# Patient Record
(demographics unavailable — no encounter records)

---

## 2024-10-18 NOTE — PHYSICAL EXAM
[General Appearance - Alert] : alert [General Appearance - In No Acute Distress] : in no acute distress [Sclera] : the sclera and conjunctiva were normal [Hearing Threshold Finger Rub Not [place]] : hearing was normal [Jugular Venous Distention Increased] : there was no jugular-venous distention [Auscultation Breath Sounds / Voice Sounds] : lungs were clear to auscultation bilaterally [Heart Sounds] : normal S1 and S2 [Heart Sounds Gallop] : no gallops [Murmurs] : no murmurs [Edema] : there was no peripheral edema [Abdomen Tenderness] : non-tender [No Spinal Tenderness] : no spinal tenderness [Abnormal Walk] : normal gait [] : no rash [No Focal Deficits] : no focal deficits

## 2024-10-18 NOTE — ASSESSMENT
[FreeTextEntry1] : 1. Hypertension: BP in target. No need to continue Indapamide as the patient is on Valsartan/HCTZ. Labs today to check serum K.  2. Hypokalemia. On Eplerenone. 3. ELISA. Evaluation for INSPIRE device. Return in 2 months.

## 2024-10-18 NOTE — HISTORY OF PRESENT ILLNESS
[FreeTextEntry1] : Palpitations have resolved. Had a sinus infection. Dr. Hoang  is his ENT. Received antibiotics (does not recall the name). Being evaluated for Inspire device for ELISA as he does not tolerate the CPAP mask.   Feels better.  Here for BP check.  Reviewing the list of medications, it shows that the patient is also on Indapamide, 1.25 mg per day.

## 2024-10-18 NOTE — REASON FOR VISIT
[Follow-Up] : a follow-up visit [FreeTextEntry1] : Essential hypertension, hypokalemia, ELISA, palpitations.

## 2024-12-10 NOTE — PHYSICAL EXAM
[General Appearance - Alert] : alert [General Appearance - In No Acute Distress] : in no acute distress [Sclera] : the sclera and conjunctiva were normal [Hearing Threshold Finger Rub Not [place]] : hearing was normal [Jugular Venous Distention Increased] : there was no jugular-venous distention [Auscultation Breath Sounds / Voice Sounds] : lungs were clear to auscultation bilaterally [Heart Sounds] : normal S1 and S2 [Heart Sounds Gallop] : no gallops [Murmurs] : no murmurs [Edema] : there was no peripheral edema [Abdomen Tenderness] : non-tender [No Spinal Tenderness] : no spinal tenderness [Abnormal Walk] : normal gait [] : no rash [No Focal Deficits] : no focal deficits

## 2024-12-10 NOTE — HISTORY OF PRESENT ILLNESS
[FreeTextEntry1] : Palpitations have resolved.  Discontinued Crestor because of myalgias and arthralgias.  States he feels foggy about one hour after taking his medications in the morning.  Uses the CPAP but often wakes up in the morning without it. Considering Inspire device. Otherwise doing well.

## 2024-12-10 NOTE — ASSESSMENT
[FreeTextEntry1] : 1. Hypertension: BP in target. No need to continue Indapamide as the patient is on Valsartan/HCTZ. 2. Hypokalemia. On Eplerenone. Controlled 3. ELISA. Evaluation for INSPIRE device. 4. Fatigue: no changes in meds. Symptoms may be related to ELISA. 5. Hyperlipidemia: statin intolerance. Refer to Dr. Uriel Duvall for CV risk evaluation and treatment.  Return in 2 months.

## 2025-02-28 NOTE — ASSESSMENT
[FreeTextEntry1] : 1. Worsening of chronic sinusitis symptoms. Possible side effects of Repatha. Will discontinue it for 2 months. If improvement may try Praluent 75 mg every two weeks. 2. Essential hypertension: not controlled. Unclear what medication the patient is taking. The patient will review his home meds and email the list to me. Will make the appropriate changes once we know his regimen.  3 ELISA. Considering Inspire. Has follow up polysomonography but will do the test once his sinus symptoms have improved. 4. Hyperlipidemia intolerant to several statins, now also to Repatha. May need to switch to Praluent. Will review home medications and advise the patient.  Follow up 3 months.

## 2025-02-28 NOTE — PHYSICAL EXAM
[General Appearance - Alert] : alert [General Appearance - In No Acute Distress] : in no acute distress [Sclera] : the sclera and conjunctiva were normal [Hearing Threshold Finger Rub Not [place]] : hearing was normal [Jugular Venous Distention Increased] : there was no jugular-venous distention [Auscultation Breath Sounds / Voice Sounds] : lungs were clear to auscultation bilaterally [Heart Sounds] : normal S1 and S2 [Heart Sounds Gallop] : no gallops [Murmurs] : no murmurs [Edema] : there was no peripheral edema [No Spinal Tenderness] : no spinal tenderness [Abnormal Walk] : normal gait [] : no rash [No Focal Deficits] : no focal deficits [Oriented To Time, Place, And Person] : oriented to person, place, and time

## 2025-02-28 NOTE — END OF VISIT
[Time Spent: ___ minutes] : I have spent [unfilled] minutes of time on the encounter which excludes teaching and separately reported services.
(4) excellent

## 2025-02-28 NOTE — HISTORY OF PRESENT ILLNESS
[FreeTextEntry1] : Mr. Alegria has been experiencing several problems. Since starting Repatha he has developed upper respiratory symptoms w/ running nose and severe spells of cough interfering w/ his speech and causing chest discomfort. The symptoms are worse the days after he takes the Repatha and improve just before taking the next dose.  Also, he experience periods of fatigue and loss of concentration and at times his pulse rate in the 40s.  He continue to struggle w/ ELISA and although he tries to wear the CPAP mask every night, at times he wakes up at 3 AM and cannot fall back to sleep.  He monitors his BP at home. Review of the data shows that about 50% of the time his BP is over 140/90.  I have attempted to reconcile his medications: his list of meds include Torsemide 10 mg and does not have Valsartan/HCTZ. He is on Procardia 60 mg and on Carvedilol 6.25 mg bid. On Eplerenone 25 mg bid. Here for follow up.

## 2025-06-13 NOTE — ASSESSMENT
[FreeTextEntry1] : 1. Uncontrolled hypertension: side effects from Procardia and hair loss on Metoprolol. Long discussion about need to control modifiable risk factor for CVD.  Will add Amlodipine 2.5 mg and lower the Metoprolol dose to 25 mg per day. Consider combination Olmesartan, Amlodipine, Chlorthaldone.  Consideration for Minoxidil and renal denervation. Will do labs to screen for hyperaldosteronism (unlikely but possible).  2. Hyperlipidemia: discuss that this is also a CVD risk factor. Intolerant to statin and worsening sinus issues w/ Repatha 140 mg. In the future w/ try lower dose Praluent 75 mg SQ every two weeks. 3. ELISA: tolerating CPAP. May consider Zepbound for weight loss and improvement of ELISA and blood pressure (weight loss).  Follow up in August.

## 2025-06-13 NOTE — PHYSICAL EXAM
[General Appearance - Alert] : alert [General Appearance - In No Acute Distress] : in no acute distress [Sclera] : the sclera and conjunctiva were normal [Hearing Threshold Finger Rub Not [place]] : hearing was normal [Jugular Venous Distention Increased] : there was no jugular-venous distention [Auscultation Breath Sounds / Voice Sounds] : lungs were clear to auscultation bilaterally [Heart Sounds] : normal S1 and S2 [Heart Sounds Gallop] : no gallops [Murmurs] : no murmurs [Edema] : there was no peripheral edema [No Spinal Tenderness] : no spinal tenderness [Abnormal Walk] : normal gait [] : no rash [No Focal Deficits] : no focal deficits [Oriented To Time, Place, And Person] : oriented to person, place, and time

## 2025-06-13 NOTE — HISTORY OF PRESENT ILLNESS
[FreeTextEntry1] : Currently on Metoprolol 50 mg (but noted thinning of his hair), Valsartan 160/HCTZ 25, Eplerenone 25 mg bid. No Procardia as he was experiencing lightheadedness and weakness in his legs.  Using the CPAP regularly. Home BP in the 150/90 sometimes higher. Check his BP at different times. Concerned about the lack of BP control despite medications. Has seen his urologist who advised for him to have renal arterial Doppler. To be scheduled.  Also his PSA needs to be repeated (s/p Urolift). Here for evaluation of uncontrolled presumably essential hypertension.

## 2025-06-23 NOTE — HISTORY OF PRESENT ILLNESS
[FreeTextEntry1] : Dear Oziel,   I had the pleasure of seeing your patient ANASTASIA PAREDES for Cardiometabolic evaluation.   As you know, he  is a Pleasant, 60 year old with a past medical history of Obesity/ELISA, Hyperlipidemia, Statin Intolerance to Atorva/Rosuva for > 8 wks tried twice, Minimal ASCVD By CAC 2022 AU 33 AU,  HTN =============== LDLc 189 mg/dL  TTE -- 4-2024 - LVEF  Nl (OUTSIDE) =============== Obesity/ELISA/Hyperlipidemia, Statin Intolerance to Atorva/Rosuva for > 8 wks tried twice - TTE - Consider GLP1RA in Future - Start Repatha   Known PVCs 2% - Uninducible by EPS per Pt (By Dr. Rosado)   HTN - Carvedilol 12.5 BID - Nifedipine 30 XL - Valsartan-HCTZ 160/12.5 - Eplerenone 25    ---------------------------------------------------------------------------- ---------------------------------------------------------------------------- 6-2025 CC: Heart Issues - Patient reports no chest pain, no localization to the sternum, no radiation to the neck/jaw, no alleviating nor worsening precipitants to CP, no assoc symptoms to CP - Patient notes no associated SOB/Palps/Leg swelling - Reports No associated F/C/N/V/Headaches - Reports Normal Exercise Tolerance - Reports no medication changes - Reports normal mood/quality of life - Reports no associated midnight awakenings from cP - Reports no diet changes - Reports no associated body aches- Reports no recent colds/viruses- Recent labs/imaging reviewed- Relevant Family history reviewed Mother/Father - CVRisk Assessment for 10 Year ACC/AHA Pooled Risk Cohort Equation places this person at < 7.5% Risk of ASCVD - Carvedilol 12.5 BID - Nifedipine 30 XL - Valsartan-HCTZ 160/12.5- Eplerenone 25   - Adverse effect to Repatha - > Trying to Switch to Praluent 75, which is reasonable

## 2025-06-23 NOTE — PHYSICAL EXAM

## 2025-06-23 NOTE — DISCUSSION/SUMMARY
[EKG obtained to assist in diagnosis and management of assessed problem(s)] : EKG obtained to assist in diagnosis and management of assessed problem(s) [FreeTextEntry1] : In summary   Evelin, 60 year old with a past medical history of Obesity/ELISA, Hyperlipidemia, Statin Intolerance to Atorva/Rosuva for > 8 wks tried twice, Minimal ASCVD By CAC 2022 AU 33 AU,  HTN =============== LDLc 189 mg/dL  TTE -- 4-2024 - LVEF  Nl (OUTSIDE) =============== Obesity/ELISA/Hyperlipidemia, Statin Intolerance to Atorva/Rosuva for > 8 wks tried twice [place] - TTE - Consider GLP1RA in Future - - Adverse effect to Repatha -> Dr Mak switched to Praluent 75, which is very reasonable; awaiting response   Known PVCs 2% - Uninducible by EPS per Pt (By Dr. Rosado)   HTN - Carvedilol 12.5 BID - Nifedipine 30 XL - Valsartan-HCTZ 160/12.5 - Eplerenone 25       Oziel, kind thanks for the referral.   Uriel Duvall MD [facility] SADIE JIN Director, Preventive Cardiology & Lipidology [facility] Cardiovascular [place]                                                                                                                                                                                                                                                                     --                                                                                                                                                                                                                                                                                                                                                                                                                                                                                                                                                                                                                                                                                                                            ----------- Additional 15 minutes was provided for preventive counseling on healthy diet, weight maintenance, CV risk reduction. Specifically, and separate from other cardiovascular evaluation and treatment, we discussed ANASTASIA 's willingness to focus  on lifestyle changes, particularly dietary; including greater consumption of vegetables, fruits over saturated fats. We discussed appropriate follow up to monitor progress on these areas. We also discussed the importance of weight control to reduce any exacerbation of underlying conditions.

## 2025-06-23 NOTE — PHYSICAL EXAM

## 2025-06-23 NOTE — DISCUSSION/SUMMARY
[EKG obtained to assist in diagnosis and management of assessed problem(s)] : EKG obtained to assist in diagnosis and management of assessed problem(s) [FreeTextEntry1] : In summary   Evelin, 60 year old with a past medical history of Obesity/ELISA, Hyperlipidemia, Statin Intolerance to Atorva/Rosuva for > 8 wks tried twice, Minimal ASCVD By CAC 2022 AU 33 AU,  HTN =============== LDLc 189 mg/dL  TTE -- 4-2024 - LVEF  Nl (OUTSIDE) =============== Obesity/ELISA/Hyperlipidemia, Statin Intolerance to Atorva/Rosuva for > 8 wks tried twice [place] - TTE - Consider GLP1RA in Future - - Adverse effect to Repatha -> Dr Mak switched to Praluent 75, which is very reasonable; awaiting response   Known PVCs 2% - Uninducible by EPS per Pt (By Dr. Rosado)   HTN - Carvedilol 12.5 BID - Nifedipine 30 XL - Valsartan-HCTZ 160/12.5 - Eplerenone 25       Oziel, kind thanks for the referral.   Uriel Duvall MD [facility] SADIE JIN Director, Preventive Cardiology & Lipidology [facility] Cardiovascular [place]                                                                                                                                                                                                                                                                     --                                                                                                                                                                                                                                                                                                                                                                                                                                                                                                                                                                                                                                                                                                                            ----------- Additional 15 minutes was provided for preventive counseling on healthy diet, weight maintenance, CV risk reduction. Specifically, and separate from other cardiovascular evaluation and treatment, we discussed ANASTASIA 's willingness to focus  on lifestyle changes, particularly dietary; including greater consumption of vegetables, fruits over saturated fats. We discussed appropriate follow up to monitor progress on these areas. We also discussed the importance of weight control to reduce any exacerbation of underlying conditions.